# Patient Record
Sex: FEMALE
[De-identification: names, ages, dates, MRNs, and addresses within clinical notes are randomized per-mention and may not be internally consistent; named-entity substitution may affect disease eponyms.]

---

## 2018-06-06 ENCOUNTER — HOSPITAL ENCOUNTER (EMERGENCY)
Dept: HOSPITAL 14 - H.ER | Age: 63
Discharge: HOME | End: 2018-06-06
Payer: COMMERCIAL

## 2018-06-06 VITALS
TEMPERATURE: 98.5 F | RESPIRATION RATE: 15 BRPM | SYSTOLIC BLOOD PRESSURE: 130 MMHG | HEART RATE: 77 BPM | DIASTOLIC BLOOD PRESSURE: 78 MMHG

## 2018-06-06 VITALS — OXYGEN SATURATION: 98 %

## 2018-06-06 DIAGNOSIS — M25.561: Primary | ICD-10-CM

## 2018-06-06 PROCEDURE — 96372 THER/PROPH/DIAG INJ SC/IM: CPT

## 2018-06-06 PROCEDURE — 93971 EXTREMITY STUDY: CPT

## 2018-06-06 PROCEDURE — 99283 EMERGENCY DEPT VISIT LOW MDM: CPT

## 2018-06-06 NOTE — ED PDOC
Lower Extremity Pain/Injury


Time Seen by Provider: 18 20:19


Chief Complaint (Nursing): Lower Extremity Problem/Injury


Chief Complaint (Provider): left knee pain


History Per: Patient


Additional Complaint(s): 


62-year-old female presents with pain and swelling to back of left knee ongoing 

for 2 and half weeks. She was seen at urgent care last week and had x-rays of 

left knee completed which were read as negative. Patient has been taking Motrin 

and Flexeril but has not noticed any improvement to pain. Patient states pain 

is worse when she is walking, better with rest. Denies recent travel. No fever 

or chills.  





PMD: none





Past Medical History


Reviewed: Historical Data, Nursing Documentation, Vital Signs


Vital Signs: 





 Last Vital Signs











Temp  98.0 F   18 20:14


 


Pulse  67   18 20:14


 


Resp  16   18 20:14


 


BP  132/77   18 20:14


 


Pulse Ox  98   18 20:14














- Medical History


PMH: No Chronic Diseases





- Surgical History


Other surgeries: ovarian abscess removal at age 21





- Family History


Family History: States: No Known Family Hx





- Living Arrangements


Living Arrangements: With Family





- Social History


Current smoker - smoking cessation education provided: No


Alcohol: None


Drugs: Denies





- Home Medications


Home Medications: 


 Ambulatory Orders











 Medication  Instructions  Recorded


 


Naproxen [Naprosyn] 500 mg PO BID #20 tab 18


 


traMADol [Ultram] 50 mg PO Q6H PRN #15 tab 18














- Allergies


Allergies/Adverse Reactions: 


 Allergies











Allergy/AdvReac Type Severity Reaction Status Date / Time


 


No Known Allergies Allergy   Verified 18 20:14














Wells Criteria for PE





- Wells Criteria for Pulmonary Embolism


Clinical Signs and Symptoms of DVT: Yes


P.E is #1 Diagnosis, or Equally Likely: No


Heart Rate >100: No


Immobilization at least 3 days;Surgery previous 4 weeks: No


Previous, objectively diagnosed PE or DVT: No


Hemoptysis: No


Malignancy w/treatment within 6 months, or palliative: No


Total Score: 3





Review of Systems


ROS Statement: Except As Marked, All Systems Reviewed And Found Negative


Constitutional: Negative for: Fever, Chills


Cardiovascular: Negative for: Chest Pain


Respiratory: Negative for: Cough, Shortness of Breath, SOB with Exertion


Gastrointestinal: Negative for: Nausea, Vomiting


Musculoskeletal: Positive for: Other (pain to left leg x 2.5 weeks)





Physical Exam





- Reviewed


Nursing Documentation Reviewed: Yes


Vital Signs Reviewed: Yes





- Physical Exam


Appears: Positive for: Well, Non-toxic, No Acute Distress


Skin: Positive for: Normal Color.  Negative for: Rash


Eye Exam: Positive for: Normal appearance


Cardiovascular/Chest: Positive for: Regular Rate, Rhythm


Respiratory: Positive for: Normal Breath Sounds


Back: Positive for: Normal Inspection.  Negative for: Vertebral Tenderness


Extremity: Positive for: Other (tenderness posterior aspect of left knee with 

full rom, left calf tenderness, normal distal sensation left lower extremity)


Neurologic/Psych: Positive for: Alert, Oriented





- ECG


O2 Sat by Pulse Oximetry: 98


Pulse Ox Interpretation: Normal





- Other Rad


  ** Doppler left leg


X-Ray: Read By Radiologist


X-Ray Interpretation: no DVT, no baker's cyst





Medical Decision Making


Medical Decision Makin year old with left leg pain





Plan:


IM toradol


Doppler left leg 





Patient is aware  of US results.  All questions answered.  





Toradol did not help with pain, tramadol 50 mg PO dose given in ED along with 

rx for same.  Patient declined ace wrap and knee immobilizer as well as 

crutches.  Advised ortho follow up for further evaluation.  





Disposition





- Clinical Impression


Clinical Impression: 


 Knee pain








- Patient ED Disposition


Is Patient to be Admitted: No


Counseled Patient/Family Regarding: Studies Performed, Diagnosis, Need For 

Followup, Rx Given





- Disposition


Referrals: 


Dmitriy Jarrell MD [Staff Provider] - 


Disposition: Routine/Home


Disposition Time: 21:57


Condition: STABLE


Additional Instructions: 


Ice, rest and elevate affected area. Take prescription meds as directed as 

needed for pain. Follow-up with orthopedist for further evaluation.


Prescriptions: 


Naproxen [Naprosyn] 500 mg PO BID #20 tab


traMADol [Ultram] 50 mg PO Q6H PRN #15 tab


 PRN Reason: Pain, Moderate (4-7)


Instructions:  Active Range of Motion Exercises, Knees and Ankles, Stretching 

Exercises for Your Lower Body, Calf Stretches, Hamstring Stretches, Knee Pain (

DC)


Forms:  [employer] (English)

## 2018-06-06 NOTE — US
EXAM:

  US Duplex Left Lower Extremity Veins



CLINICAL HISTORY:

  62 years old, female; Pain; Leg, lower; Left; Additional info: Pain, swelling 

left leg



TECHNIQUE:

  Real-time duplex ultrasound scan of the left lower extremity veins 

integrating B-mode two-dimensional vascular structure, Doppler spectral 

analysis, color flow Doppler imaging and compression.



COMPARISON:

  No relevant prior studies available.



FINDINGS:

  Deep veins:  Normal color and spectral Doppler flow.  Normal compressibility. 

 No deep vein thrombosis.

  Superficial veins:  No thrombosis.

  Soft tissues:  No popliteal cyst.



IMPRESSION:     

  No evidence of DVT within left lower extremity.